# Patient Record
Sex: FEMALE | Race: OTHER
[De-identification: names, ages, dates, MRNs, and addresses within clinical notes are randomized per-mention and may not be internally consistent; named-entity substitution may affect disease eponyms.]

---

## 2017-03-17 ENCOUNTER — HOSPITAL ENCOUNTER (EMERGENCY)
Dept: HOSPITAL 63 - ER | Age: 72
Discharge: HOME | End: 2017-03-17
Payer: MEDICARE

## 2017-03-17 VITALS — SYSTOLIC BLOOD PRESSURE: 117 MMHG | DIASTOLIC BLOOD PRESSURE: 71 MMHG
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
## 2017-03-17 DIAGNOSIS — I10: ICD-10-CM
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
## 2017-03-17 DIAGNOSIS — S09.8XXA: Primary | ICD-10-CM

## 2017-03-17 PROCEDURE — 70450 CT HEAD/BRAIN W/O DYE: CPT

## 2017-03-17 PROCEDURE — 72125 CT NECK SPINE W/O DYE: CPT

## 2017-03-17 NOTE — ED.ADGEN
Past History


Past Medical History:  Hypertension





Adult General


Chief Complaint


Chief Complaint


" .. I fell 2 days ago.. working in the yard and garden...and hit the Rt. side 

of my head... I am still a little dizzy and off.. and the bruise is getting 

bigger..."





HPI


HPI





Patient is a 71 year old female who presents with above hx and complaints of Rt 

sided head injury.  Pt. has area of ecchymosis on right temporal.  Pt. normally 

healthy. Does have a history of hypertension which she takes lisinopril .. 

Patient states that since injury she been somewhat"off" and dizzy.  Patient 

does state the area of ecchymosis is seeming to increase. Patient denies use of 

any anticoagulants. Patient follows at [facility].





Review of Systems


Review of Systems





Constitutional: Denies fever or chills []


Eyes: Denies change in visual acuity, redness, or eye pain []


HENT: Denies nasal congestion or sore throat [ Complaints of head injury


Respiratory: Denies cough or shortness of breath []


Cardiovascular: No additional information not addressed in HPI []


GI: Denies abdominal pain, nausea, vomiting, bloody stools or diarrhea []


: Denies dysuria or hematuria []


Musculoskeletal: Denies back pain or joint pain []


Integument: Denies rash or skin lesions []


Neurologic: Denies headache, focal weakness or sensory changes []


Endocrine: Denies polyuria or polydipsia []





Family History


Family History


Noncontributory





Current Medications


Current Medications


See nursing for home meds





Allergies


Allergies





 Allergies








Coded Allergies Type Severity Reaction Last Updated Verified


 


  No Known Drug Allergies    3/17/17 No





No known drug allergies





Physical Exam


Physical Exam





Constitutional: , no acute distress, non-toxic appearance. []


HENT: Normocephalic, contusion right temporal, bilateral external ears normal, 

oropharynx moist, no oral exudates, nose normal. []


Eyes: PERRLA, EOMI, conjunctiva normal, no discharge. [] 


Neck: Normal range of motion, no tenderness, supple, no stridor. [] 


Cardiovascular:Heart rate regular rhythm, no murmur []


Lungs & Thorax:  Bilateral breath sounds clear to auscultation []


Abdomen: Bowel sounds normal, soft, no tenderness, no masses, no pulsatile 

masses. [] 


Skin: Warm, dry, no erythema, no rash. [] 


Back: No tenderness, no CVA tenderness. [] 


Extremities: No tenderness, no cyanosis, no clubbing, ROM intact, no edema. 

Arthritic changes


Neurologic: Alert and oriented X 3, normal motor function, normal sensory 

function, no focal deficits noted.  are equal. Slightly off on Romberg and 

finger to nose bilaterally. DTRs +2. Patella and brachial. Drift bilaterally


Psychologic: Affect anxious, judgement normal, mood normal. []





Current Patient Data


Vital Signs








 Vital Signs








  Date Time  Temp Pulse Resp B/P Pulse Ox O2 Delivery O2 Flow Rate FiO2


 


3/17/17 19:24  51 18 117/71 98 Room Air  


 


3/17/17 17:15 97.6       














EKG


EKG


[]





Radiology/Procedures


Radiology/Procedures


My interpretation of CT head shows no shift, mass, edema, bleed, or fracture. 

Does have findings of soft tissue edema at area of contusion.





Course & Med Decision Making


Course & Med Decision Making


Pertinent Labs and Imaging studies reviewed. (See chart for details).  Ice when 

necessary. Follow-up primary care. May take Tylenol for pain. Follow-up primary 

care. Return if any concerns.





[]





Final Impression


Final Impression


1.  Closed Head Injury[]


Problems:  





Dragon Disclaimer


Dragon Disclaimer


This electronic medical record was generated, in whole or in part, using a 

voice recognition dictation system.








COURTNEY KRISHNAMURTHY MD Mar 17, 2017 17:41

## 2018-03-07 ENCOUNTER — HOSPITAL ENCOUNTER (EMERGENCY)
Dept: HOSPITAL 63 - ER | Age: 73
Discharge: HOME | End: 2018-03-07
Payer: MEDICARE

## 2018-03-07 VITALS — SYSTOLIC BLOOD PRESSURE: 123 MMHG | DIASTOLIC BLOOD PRESSURE: 78 MMHG

## 2018-03-07 DIAGNOSIS — I10: ICD-10-CM
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
## 2018-03-07 DIAGNOSIS — S61.412A: Primary | ICD-10-CM

## 2018-03-07 PROCEDURE — 90471 IMMUNIZATION ADMIN: CPT

## 2018-03-07 PROCEDURE — 90715 TDAP VACCINE 7 YRS/> IM: CPT

## 2018-03-07 PROCEDURE — 12001 RPR S/N/AX/GEN/TRNK 2.5CM/<: CPT

## 2018-03-07 NOTE — PHYS DOC
Past History


Past Medical History:  Hypertension


Past Surgical History:  Hysterectomy, Tonsillectomy, Other


Alcohol Use:  None


Drug Use:  None





Adult General


Chief Complaint


Chief Complaint:  LACERATION/AVULSION





HPI


HPI





Patient is a 72-year-old female who presents ambulatory to the ED with a 

laceration to her left hand. She was using a knife to open a package and 

accidentally cut her left hand. Last tetanus shot was more than 10 years ago so 

she especially wants a tetanus shot.





Review of Systems


Review of Systems





Constitutional: Denies fever or chills []





Current Medications


Current Medications





Current Medications








 Medications


  (Trade)  Dose


 Ordered  Sig/Radha  Start Time


 Stop Time Status Last Admin


Dose Admin


 


 Diphtheria/


 Tetanus/Acell


 Pertussis


  (Boostrix)  0.5 ml  ONCE ONCE  3/7/18 17:00


 3/7/18 17:01   


 











Allergies


Allergies





Allergies








Coded Allergies Type Severity Reaction Last Updated Verified


 


  No Known Drug Allergies    3/17/17 No











Physical Exam


Physical Exam





Constitutional: Well developed, well nourished, no acute distress, non-toxic 

appearance. []


HENT: Normocephalic, atraumatic, bilateral external ears normal, nose normal. []


Eyes:  conjunctiva normal, no discharge. [] 


Neck: Normal range of motion,  no stridor. [] 


Skin: Warm, dry, no erythema, no rash. [] 


Extremities: Left hand: 1 cm laceration present over the radial aspect of the 

second MCP joint. It is very superficial but has been bleeding.


Neurologic: Alert and oriented X 3, normal motor function,  no focal deficits 

noted. []





Current Patient Data


Vital Signs





 Vital Signs








  Date Time  Temp Pulse Resp B/P (MAP) Pulse Ox O2 Delivery O2 Flow Rate FiO2


 


3/7/18 16:39 98.1 66 18  100 Room Air  











EKG


EKG


[]





Radiology/Procedures


Radiology/Procedures


Procedure:


Repair of left hand laceration with skin adhesive by me


The 1 cm laceration on the thumb side of the left second knuckle is very 

superficial but barely into the subcutaneous tissue and has been bleeding. 

After holding pressure with gauze it is hemostatic. The laceration was 

approximated and adhered with skin adhesive. Good result.[]





Course & Med Decision Making


Course & Med Decision Making


Pertinent Labs and Imaging studies reviewed. (See chart for details)





[]





Dragon Disclaimer


Dragon Disclaimer


This electronic medical record was generated, in whole or in part, using a 

voice recognition dictation system.





Departure


Departure:


Impression:  


 Primary Impression:  


 Laceration of left hand


Disposition:  01 HOME, SELF-CARE


Condition:  IMPROVED


Referrals:  


JURGEN ARNOLD MD (PCP)





Additional Instructions:  


Try to avoid overusing your hand to avoid popping the laceration back open.


You may get it wet briefly as needed but avoid getting it wet for any length of 

time.


Do not put any topical ointment or other substance on the cut.


If it does bleed more or comes open, you may use a Band-Aid to cover.











PRADEEP ROSARIO MD Mar 7, 2018 16:53

## 2018-10-10 ENCOUNTER — HOSPITAL ENCOUNTER (EMERGENCY)
Dept: HOSPITAL 63 - ER | Age: 73
Discharge: HOME | End: 2018-10-10
Payer: MEDICARE

## 2018-10-10 VITALS
SYSTOLIC BLOOD PRESSURE: 141 MMHG | SYSTOLIC BLOOD PRESSURE: 141 MMHG | DIASTOLIC BLOOD PRESSURE: 85 MMHG | DIASTOLIC BLOOD PRESSURE: 85 MMHG

## 2018-10-10 VITALS — HEIGHT: 64 IN | WEIGHT: 134 LBS | BODY MASS INDEX: 22.88 KG/M2

## 2018-10-10 DIAGNOSIS — J02.9: Primary | ICD-10-CM

## 2018-10-10 DIAGNOSIS — I10: ICD-10-CM

## 2018-10-10 PROCEDURE — 87880 STREP A ASSAY W/OPTIC: CPT

## 2018-10-10 PROCEDURE — 99283 EMERGENCY DEPT VISIT LOW MDM: CPT

## 2018-10-10 PROCEDURE — 87070 CULTURE OTHR SPECIMN AEROBIC: CPT

## 2018-10-10 NOTE — PHYS DOC
Past History


Past Medical History:  Hypertension


Past Surgical History:  Hysterectomy, Tonsillectomy, Other


Alcohol Use:  None


Drug Use:  None





Adult General


Chief Complaint


Chief Complaint:  SORE THROAT





HPI


HPI





Patient is a 73 year old female who presents with complaining of sore throat 

for 2 days. Patient states she has hoarseness this morning that improved. 

Patient denies fever and chills, earache, nasal congestion and cough, vomiting 

and diarrhea. Patient had recent sick contact.





Review of Systems


Review of Systems





Constitutional: Denies fever or chills []


Eyes: Denies change in visual acuity, redness, or eye pain []


HENT: Reports sore throat


Respiratory: Denies cough or shortness of breath []


Cardiovascular: No additional information not addressed in HPI []


GI: Denies abdominal pain, nausea, vomiting, bloody stools or diarrhea []


: Denies dysuria or hematuria []


Musculoskeletal: Denies back pain or joint pain []


Integument: Denies rash or skin lesions []


Neurologic: Denies headache, focal weakness or sensory changes []


Endocrine: Denies polyuria or polydipsia []





All other systems were reviewed and found to be within normal limits, except as 

documented in this note.





Allergies


Allergies





Allergies








Coded Allergies Type Severity Reaction Last Updated Verified


 


  No Known Drug Allergies    3/17/17 No











Physical Exam


Physical Exam





Constitutional: Well developed, well nourished, no acute distress, non-toxic 

appearance. []


HENT: Normocephalic, atraumatic, bilateral external ears normal, oropharynx 

moist, pharyngeal erythema, no oral exudates, nose normal. []


Eyes: PERRLA, EOMI, conjunctiva normal, no discharge. [] 


Neck: Normal range of motion, no tenderness, supple, no stridor. [] 


Cardiovascular:Heart rate regular rhythm, no murmur []


Lungs & Thorax:  Bilateral breath sounds clear to auscultation []


Skin: Warm, dry, no erythema, no rash. [] 


Back: No tenderness, no CVA tenderness. [] 


Extremities: No tenderness, no cyanosis, no clubbing, ROM intact, no edema. [] 


Neurologic: Alert and oriented X 3, normal motor function, normal sensory 

function, no focal deficits noted. []


Psychologic: Affect normal, judgement normal, mood normal. []





EKG


EKG


[]





Radiology/Procedures


Radiology/Procedures


[]





Course & Med Decision Making


Course & Med Decision Making


Pertinent Labs reviewed. (See chart for details)





discharge:





I've spoken with the patient and/or caregivers. I've explained the patient's 

condition, diagnosis and treatment plan based on information available to me at 

this time. I've answered the patient's and/or caregivers questions and 

addressed any concerns. The patient and/or caregivers have a good understanding 

the patient's diagnosis, condition and treatment plan as can be expected at 

this point. Vital signs have been stabilized. The patient's condition is stable 

for discharge from the emergency department.





The patient will pursue further outpatient evaluation with her primary care 

provider or other designated consulting physician as outlined in the discharge 

instructions. Patient and/or caregivers are agreeable to this plan of care and 

follow-up instructions have been explained in detail. The patient and/or 

caregivers have received these instructions in written format and expressed 

understanding of these discharge instructions. The patient and her caregivers 

are aware that if any significant change in condition or worsening of symptoms 

should prompt him to immediately return to this of the closest emergency 

department.  If an emergent department is not readily available I would 

encourage him to call 911.





Sandhya Disclaimer


Dragon Disclaimer


This electronic medical record was generated, in whole or in part, using a 

voice recognition dictation system.





Departure


Departure:


Impression:  


 Primary Impression:  


 Acute pharyngitis


Disposition:  01 HOME, SELF-CARE (at 1337)


Condition:  STABLE


Referrals:  


JURGEN ARNOLD MD (PCP)


Patient Instructions:  Viral and Bacterial Pharyngitis





Additional Instructions:  


Drink plenty of liquids


Follow-up with your primary care physician in 3-5 days


Return to ER if not getting better


Scripts


Azithromycin (ZITHROMAX) 250 Mg Tablet


1 PKG PO UD, #1 PKG


   Prov: NAKUL DAVIS MD         10/10/18











NAKUL DAVIS MD Oct 10, 2018 13:38

## 2019-01-03 ENCOUNTER — HOSPITAL ENCOUNTER (OUTPATIENT)
Dept: HOSPITAL 63 - US | Age: 74
Discharge: HOME | End: 2019-01-03
Payer: MEDICARE

## 2019-01-03 DIAGNOSIS — N18.3: Primary | ICD-10-CM

## 2019-01-03 DIAGNOSIS — N26.1: ICD-10-CM

## 2019-01-03 PROCEDURE — 76770 US EXAM ABDO BACK WALL COMP: CPT

## 2019-01-03 NOTE — RAD
EXAM: Renal sonogram.

 

HISTORY: Renal insufficiency.

 

TECHNIQUE: Sonographic imaging of the kidneys and bladder was performed.

 

COMPARISON: None.

 

FINDINGS: The right kidney measures 8.2 cm [id number]. The left kidney 

measures 9.5 cm [id number]. There is right renal cortical thinning. No 

solid or cystic renal lesion is seen. There is no hydronephrosis. The 

bladder is unremarkable. The ureteral jets are both seen.

 

IMPRESSION:

1. Mild right renal atrophy and cortical thinning.

2. Otherwise, unremarkable renal sonogram.

 

Electronically signed by: Judi Rashid MD (1/3/2019 3:01 PM) [facility]

## 2019-09-07 ENCOUNTER — HOSPITAL ENCOUNTER (EMERGENCY)
Dept: HOSPITAL 63 - ER | Age: 74
Discharge: HOME | End: 2019-09-07
Payer: MEDICARE

## 2019-09-07 VITALS — WEIGHT: 140 LBS | BODY MASS INDEX: 23.9 KG/M2 | HEIGHT: 64 IN

## 2019-09-07 VITALS
DIASTOLIC BLOOD PRESSURE: 67 MMHG | SYSTOLIC BLOOD PRESSURE: 135 MMHG | DIASTOLIC BLOOD PRESSURE: 67 MMHG | SYSTOLIC BLOOD PRESSURE: 135 MMHG | DIASTOLIC BLOOD PRESSURE: 67 MMHG | SYSTOLIC BLOOD PRESSURE: 135 MMHG | DIASTOLIC BLOOD PRESSURE: 67 MMHG | SYSTOLIC BLOOD PRESSURE: 135 MMHG

## 2019-09-07 DIAGNOSIS — R59.0: Primary | ICD-10-CM

## 2019-09-07 DIAGNOSIS — E03.9: ICD-10-CM

## 2019-09-07 DIAGNOSIS — I10: ICD-10-CM

## 2019-09-07 PROCEDURE — 87880 STREP A ASSAY W/OPTIC: CPT

## 2019-09-07 PROCEDURE — 99283 EMERGENCY DEPT VISIT LOW MDM: CPT

## 2019-09-07 PROCEDURE — 87070 CULTURE OTHR SPECIMN AEROBIC: CPT

## 2019-09-07 NOTE — ED.ADGEN
Past History


Past Medical History:  Hypertension, Hypothyroid


Past Surgical History:  Hysterectomy, Tonsillectomy, Other


Alcohol Use:  None


Drug Use:  None





Adult General


Chief Complaint


Chief Complaint


".. I got these nodes in my neck... they come and go... I had a full Panorex of 

my teeth... I did have dental work on the left but not on the right where I had 

the lymph nodes... Also was found to have stage III kidney dysfunction and I 

have a follow-up for that but I get these nodes on the right side of my neck 

occasionally and it makes my jaw and neck tender... I am under a lot of 

stress... I'm going to a divorce.. I usually see Dr Karla Grove...for care





HPI


HPI





Patient is a 74 year old female who presents with above hx and complaints of 

sore throat, jaw pain . Patient 4 months has had intermittent episodes of 

adenopathy on right and left side of her neck.  No dental caries. No lesions on 

scalp. No other areas of adenopathy on body. No history immunosuppression. No 

recent travel. No specific ill contacts. Pt. follows with Dr. Grove.





Review of Systems


Review of Systems





Constitutional: Denies fever or chills []


Eyes: Denies change in visual acuity, redness, or eye pain []


HENT: Complaints of neck adenopathy and sore throat []


Respiratory: Denies cough or shortness of breath []


Cardiovascular: No additional information not addressed in HPI []


GI: Denies abdominal pain, nausea, vomiting, bloody stools or diarrhea []


: Denies dysuria or hematuria []


Musculoskeletal: Denies back pain or joint pain []


Integument: Denies rash or skin lesions []


Neurologic: Denies headache, focal weakness or sensory changes []


Endocrine: Denies polyuria or polydipsia []





All other systems were reviewed and found to be within normal limits, except as 

documented in this note.





Family History


Family History


Noncontributory-  currently in a divorce process with her 





Current Medications


Current Medications





Current Medications








 Medications


  (Trade)  Dose


 Ordered  Sig/Radha  Start Time


 Stop Time Status Last Admin


Dose Admin


 


 Prednisone


  (Prednisone)  50 mg  1X  ONCE  9/7/19 21:00


 9/7/19 21:01 DC 9/7/19 21:23


50 MG











Allergies


Allergies





Allergies








Coded Allergies Type Severity Reaction Last Updated Verified


 


  No Known Drug Allergies    3/17/17 No











Physical Exam


Physical Exam





Constitutional: Well developed, well nourished, no acute distress, non-toxic 

appearance. []


HENT: Normocephalic, atraumatic, bilateral external ears normal, oropharynx 

moist, no oral exudates, nose normal. []


Eyes: PERRLA, EOMI, conjunctiva normal, no discharge. [] 


Neck: Normal range of motion, no tenderness, supple, no stridor. [] Very mild 

adenopathy 1 or 2 nodes less than 1 cm on right anterior neck chain


Cardiovascular:Heart rate regular rhythm, no murmur []


Lungs & Thorax:  Bilateral breath sounds clear to auscultation []


Abdomen: Bowel sounds normal, soft, no tenderness, no masses, no pulsatile 

masses. [] 


Skin: Warm, dry, no erythema, no rash. [] 


Back: No tenderness, no CVA tenderness. [] 


Extremities: No tenderness, no cyanosis, no clubbing, ROM intact, no edema. [] 


Neurologic: Alert and oriented X 3, normal motor function, normal sensory 

function, no focal deficits noted. []


Psychologic: Affect anxious judgement normal, mood normal. []





Current Patient Data


Vital Signs





                                   Vital Signs








  Date Time  Temp Pulse Resp B/P (MAP) Pulse Ox O2 Delivery O2 Flow Rate FiO2


 


9/7/19 21:35  55 20 135/67 (89) 97 Room Air  


 


9/7/19 20:55 97.9       








Lab Results





                                Laboratory Tests








Test


 9/7/19


21:02


 


Group A Streptococcus Rapid


 Negative


(NEGATIVE)











EKG


EKG


[]





Radiology/Procedures


Radiology/Procedures


[]





Course & Med Decision Making


Course & Med Decision Making


Pertinent Labs and Imaging studies reviewed. (See chart for details).





Gargle with Listerine 4 times a day. Follow-up primary care. If adenopathy is 

persistent consider surgical removal of isolated node.  Sent half on normal 

saline and half on preservative for culture and cytology.  Tylenol and ibuprofen

 for pain. Return if any concerns





[]





Final Impression


Final Impression


1. Cervical adenopathy[]





Dragon Disclaimer


Dragon Disclaimer


This electronic medical record was generated, in whole or in part, using a voice

 recognition dictation system.





Dragon Disclaimer


This chart was dictated in whole or in part using Voice Recognition software in 

a busy, high-work load, and often noisy Emergency Department environment.  It 

may contain unintended and wholly unrecognized errors or omissions.











COURTNEY KRISHNAMURTHY MD            Sep 7, 2019 20:58

## 2019-10-10 ENCOUNTER — HOSPITAL ENCOUNTER (OUTPATIENT)
Dept: HOSPITAL 63 - RAD | Age: 74
Discharge: HOME | End: 2019-10-10
Payer: MEDICARE

## 2019-10-10 DIAGNOSIS — M19.042: Primary | ICD-10-CM

## 2019-10-10 PROCEDURE — 73130 X-RAY EXAM OF HAND: CPT

## 2019-10-10 NOTE — RAD
EXAM: PA, oblique and lateral views of the left hand

 

DATE: 10/10/2019 12:00 AM

 

INDICATION: Left hand pain

 

COMPARISON: No Prior

 

FINDINGS:

Marked osteopenia limits evaluation for acute fracture. Within these 

constraints no acute fractures identified.

 

The joint space narrowing most prominent at the DIP joints. In addition 

central erosions with gullwing appearance at the DIP joints left index and

middle finger as well as the small finger.

 

IMPRESSION:

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

 

Electronically signed by: Mohamud Amos MD (10/10/2019 4:06 PM) [facility]

## 2020-12-29 ENCOUNTER — HOSPITAL ENCOUNTER (OUTPATIENT)
Dept: HOSPITAL 63 - ECHO | Age: 75
End: 2020-12-29
Payer: MEDICARE

## 2020-12-29 DIAGNOSIS — I11.9: Primary | ICD-10-CM

## 2020-12-29 PROCEDURE — 93306 TTE W/DOPPLER COMPLETE: CPT

## 2020-12-29 NOTE — CARD
MR#: [id number]

Account#: [id number]

Accession#: [id number]

Date of Study: 12/29/2020

Ordering Physician: DEL WATERS, 

Referring Physician: DEL WATERS, 

Tech: Kate Jesus





--------------- APPROVED REPORT --------------





EXAM: Two-dimensional and M-mode echocardiogram with Doppler and color Doppler.



Other Information 

Quality : AverageHR: 64bpm



INDICATION

Hypertension/HCVD



2D DIMENSIONS 

RVDd3.0 (2.9-3.5cm)Left Atrium(2D)2.9 (1.6-4.0cm)

IVSd1.0 (0.7-1.1cm)Aortic Root(2D)3.3 (2.0-3.7cm)

LVDd4.4 (3.9-5.9cm)LVOT Diameter2.1 (1.8-2.4cm)

PWd1.1 (0.7-1.1cm)LVDs2.6 (2.5-4.0cm)

FS (%) 40.7 %SV61.8 ml

LVEF(%)71.7 (>50%)



Aortic Valve

AoV Peak Arron.126.6cm/sAoV VTI26.0cm

AO Peak GR.6.4mmHgLVOT Peak Arron.87.8cm/s

LVOT  VTI 17.73cmAO Mean GR.3mmHg

MACI (VMAX)2.78yh7AXQ   (VTI)2.44cm2

AI P 1/2 [id number]



Mitral Valve

MV E Ydbocghl54.1cm/sMV DECEL [id number]

MV A Awcafqhx80.0cm/sE/A  Ratio0.6



Pulmonary Valve

PV Peak Rgimprin03.3cm/sPV Peak Grad.2mmHg



Tricuspid Valve

TR P. Gattkbqt449bv/sRAP SHPJTDFO8iiUy

TR Peak Gr.27czLcFKLG99khUo



 LEFT VENTRICLE 

The left ventricle is normal size. There is borderline to mild concentric left ventricular hypertroph
y. The left ventricular systolic function is normal. The ejection fraction is estimated at 65%. There
 is normal LV segmental wall motion. Transmitral Doppler flow pattern is Grade I-abnormal relaxation 
pattern.



 RIGHT VENTRICLE 

The right ventricle is normal size. There is normal right ventricular wall thickness. The right ventr
icular systolic function is normal.



 ATRIA 

The left atrium size is normal. The right atrium size is normal. The interatrial septum is intact wit
h no evidence for an atrial septal defect or patent foramen ovale as noted on 2-D or Doppler imaging.




 AORTIC VALVE 

The aortic valve is normal in structure and function. Doppler and Color Flow revealed trace aortic re
gurgitation. There is no significant aortic valvular stenosis. Calculated aortic valve area is 2.7 cm
2 with maximum pressure gradient of 6 mmHg and mean pressure gradient of 3 mmHg.



 MITRAL VALVE 

The mitral valve is normal in structure and function. There is no evidence of mitral valve prolapse. 
There is no mitral valve stenosis. Doppler and Color-flow revealed trace mitral regurgitation.



 TRICUSPID VALVE 

The tricuspid valve is normal in structure and function. Doppler and Color Flow revealed trace tricus
pid regurgitation with an estimated PAP of 19 mmHg. There is no tricuspid valve stenosis.



 PULMONIC VALVE 

The pulmonic valve is not well visualized. Doppler and Color Flow revealed trace pulmonic valvular re
gurgitation.



 GREAT VESSELS 

The aortic root is normal in size. The ascending aorta is normal in size. The IVC is normal in size a
nd collapses >50% with inspiration.



 PERICARDIAL EFFUSION 

There is no evidence of significant pericardial effusion.



Critical Notification

Critical Value: No



<Conclusion>

The left ventricular systolic function is normal.

The ejection fraction is estimated at 65%.

There is normal LV segmental wall motion.

Transmitral Doppler flow pattern is Grade I-abnormal relaxation pattern.

Trace mitral regurgitation.

Trace tricuspid regurgitation with an estimated PAP of 19 mmHg.

There is no evidence of significant pericardial effusion.



Signed by : Del Waters, 

Electronically Approved : 12/29/2020 16:10:14

## 2021-04-21 ENCOUNTER — HOSPITAL ENCOUNTER (OUTPATIENT)
Dept: HOSPITAL 63 - US | Age: 76
End: 2021-04-21
Payer: MEDICARE

## 2021-04-21 DIAGNOSIS — Z90.710: ICD-10-CM

## 2021-04-21 DIAGNOSIS — R10.31: Primary | ICD-10-CM

## 2021-04-21 PROCEDURE — 76856 US EXAM PELVIC COMPLETE: CPT

## 2021-04-21 NOTE — RAD
EXAM: Pelvic sonogram.



HISTORY: Right lower quadrant pain.



TECHNIQUE: Sonographic imaging of the pelvis was performed.



COMPARISON: None.



FINDINGS: The uterus is surgically absent. The ovaries are obscured due to bowel gas. The patient def
erred transvaginal imaging for further characterization. No adnexal mass or cyst is seen. There is no
 pelvic free fluid.



IMPRESSION:

1. Surgically absent uterus.

2. Obscured ovaries. No suspicious finding is seen within the adnexal regions.



Electronically signed by: Judi Rashid MD (4/21/2021 2:04 PM) [id number]